# Patient Record
Sex: MALE | URBAN - METROPOLITAN AREA
[De-identification: names, ages, dates, MRNs, and addresses within clinical notes are randomized per-mention and may not be internally consistent; named-entity substitution may affect disease eponyms.]

---

## 2019-10-27 ENCOUNTER — COMMUNICATION - HEALTHEAST (OUTPATIENT)
Dept: SCHEDULING | Facility: CLINIC | Age: 84
End: 2019-10-27

## 2021-06-02 NOTE — TELEPHONE ENCOUNTER
Patient's partner is calling for directions to [facility].  Provided address and general directions from [facility]  Patient is in need of a Qatari style catheter 16 in or less.  Patient is going to present to [facility] for assistance with this.  Patient is not a [facility] patient, is from out of town.  Elke Rothman RN  [facility] Triage Nurse  10:35 AM  10/27/2019